# Patient Record
Sex: MALE | Race: BLACK OR AFRICAN AMERICAN
[De-identification: names, ages, dates, MRNs, and addresses within clinical notes are randomized per-mention and may not be internally consistent; named-entity substitution may affect disease eponyms.]

---

## 2020-09-19 ENCOUNTER — HOSPITAL ENCOUNTER (EMERGENCY)
Dept: HOSPITAL 8 - ED | Age: 30
Discharge: HOME | End: 2020-09-19
Payer: COMMERCIAL

## 2020-09-19 VITALS — BODY MASS INDEX: 29.41 KG/M2 | WEIGHT: 187.39 LBS | HEIGHT: 67 IN

## 2020-09-19 VITALS — SYSTOLIC BLOOD PRESSURE: 120 MMHG | DIASTOLIC BLOOD PRESSURE: 66 MMHG

## 2020-09-19 DIAGNOSIS — F17.210: ICD-10-CM

## 2020-09-19 DIAGNOSIS — R10.11: Primary | ICD-10-CM

## 2020-09-19 DIAGNOSIS — R10.13: ICD-10-CM

## 2020-09-19 LAB
ALBUMIN SERPL-MCNC: 3.5 G/DL (ref 3.4–5)
ALP SERPL-CCNC: 64 U/L (ref 45–117)
ALT SERPL-CCNC: 27 U/L (ref 12–78)
ANION GAP SERPL CALC-SCNC: 6 MMOL/L (ref 5–15)
BASOPHILS # BLD AUTO: 0.02 X10^3/UL (ref 0–0.1)
BASOPHILS NFR BLD AUTO: 0 % (ref 0–1)
BILIRUB SERPL-MCNC: 0.2 MG/DL (ref 0.2–1)
CALCIUM SERPL-MCNC: 8.5 MG/DL (ref 8.5–10.1)
CHLORIDE SERPL-SCNC: 109 MMOL/L (ref 98–107)
CREAT SERPL-MCNC: 1.26 MG/DL (ref 0.7–1.3)
EOSINOPHIL # BLD AUTO: 0.11 X10^3/UL (ref 0–0.4)
EOSINOPHIL NFR BLD AUTO: 2 % (ref 1–7)
ERYTHROCYTE [DISTWIDTH] IN BLOOD BY AUTOMATED COUNT: 13.2 % (ref 9.4–14.8)
LYMPHOCYTES # BLD AUTO: 2.31 X10^3/UL (ref 1–3.4)
LYMPHOCYTES NFR BLD AUTO: 36 % (ref 22–44)
MCH RBC QN AUTO: 27.7 PG (ref 27.5–34.5)
MCHC RBC AUTO-ENTMCNC: 31.3 G/DL (ref 33.2–36.2)
MCV RBC AUTO: 88.3 FL (ref 81–97)
MD: NO
MICROSCOPIC: (no result)
MONOCYTES # BLD AUTO: 0.57 X10^3/UL (ref 0.2–0.8)
MONOCYTES NFR BLD AUTO: 9 % (ref 2–9)
NEUTROPHILS # BLD AUTO: 3.37 X10^3/UL (ref 1.8–6.8)
NEUTROPHILS NFR BLD AUTO: 53 % (ref 42–75)
PLATELET # BLD AUTO: 346 X10^3/UL (ref 130–400)
PMV BLD AUTO: 7.6 FL (ref 7.4–10.4)
PROT SERPL-MCNC: 7.3 G/DL (ref 6.4–8.2)
RBC # BLD AUTO: 5.59 X10^6/UL (ref 4.38–5.82)

## 2020-09-19 PROCEDURE — 99406 BEHAV CHNG SMOKING 3-10 MIN: CPT

## 2020-09-19 PROCEDURE — 76700 US EXAM ABDOM COMPLETE: CPT

## 2020-09-19 PROCEDURE — 99284 EMERGENCY DEPT VISIT MOD MDM: CPT

## 2020-09-19 PROCEDURE — 87491 CHLMYD TRACH DNA AMP PROBE: CPT

## 2020-09-19 PROCEDURE — 87086 URINE CULTURE/COLONY COUNT: CPT

## 2020-09-19 PROCEDURE — 80053 COMPREHEN METABOLIC PANEL: CPT

## 2020-09-19 PROCEDURE — 87591 N.GONORRHOEAE DNA AMP PROB: CPT

## 2020-09-19 PROCEDURE — 81001 URINALYSIS AUTO W/SCOPE: CPT

## 2020-09-19 PROCEDURE — 36415 COLL VENOUS BLD VENIPUNCTURE: CPT

## 2020-09-19 PROCEDURE — 85025 COMPLETE CBC W/AUTO DIFF WBC: CPT

## 2020-09-19 PROCEDURE — 83690 ASSAY OF LIPASE: CPT

## 2020-09-19 PROCEDURE — 96372 THER/PROPH/DIAG INJ SC/IM: CPT
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
## 2020-09-19 NOTE — NUR
TASK RN: PT MEDICATED PER MAR, PROVIDED SNACKS. NAD. APPEARS COMFORTABLE, 
DENIES ADDITIONAL NEEDS AT THIS TIME.

## 2020-09-19 NOTE — NUR
D/c discussed with pt. Pt verbalizes understanding r/t meds and f/u care. 
Ambulated out of ED independently, steady gait

## 2020-12-07 ENCOUNTER — OFFICE VISIT (OUTPATIENT)
Dept: URGENT CARE | Facility: CLINIC | Age: 30
End: 2020-12-07
Payer: MEDICAID

## 2020-12-07 ENCOUNTER — HOSPITAL ENCOUNTER (EMERGENCY)
Facility: MEDICAL CENTER | Age: 30
End: 2020-12-08
Attending: EMERGENCY MEDICINE
Payer: MEDICAID

## 2020-12-07 VITALS
SYSTOLIC BLOOD PRESSURE: 110 MMHG | HEART RATE: 106 BPM | DIASTOLIC BLOOD PRESSURE: 74 MMHG | RESPIRATION RATE: 16 BRPM | HEIGHT: 67 IN | WEIGHT: 171 LBS | TEMPERATURE: 97.4 F | OXYGEN SATURATION: 98 % | BODY MASS INDEX: 26.84 KG/M2

## 2020-12-07 DIAGNOSIS — N50.89 TESTICULAR SWELLING, LEFT: ICD-10-CM

## 2020-12-07 DIAGNOSIS — I86.1 LEFT VARICOCELE: ICD-10-CM

## 2020-12-07 DIAGNOSIS — N50.812 LEFT TESTICULAR PAIN: ICD-10-CM

## 2020-12-07 DIAGNOSIS — N50.812 TESTICULAR PAIN, LEFT: ICD-10-CM

## 2020-12-07 PROCEDURE — 99203 OFFICE O/P NEW LOW 30 MIN: CPT | Performed by: NURSE PRACTITIONER

## 2020-12-07 PROCEDURE — 99283 EMERGENCY DEPT VISIT LOW MDM: CPT

## 2020-12-07 PROCEDURE — 96372 THER/PROPH/DIAG INJ SC/IM: CPT

## 2020-12-07 ASSESSMENT — ENCOUNTER SYMPTOMS: FEVER: 0

## 2020-12-08 ENCOUNTER — APPOINTMENT (OUTPATIENT)
Dept: RADIOLOGY | Facility: MEDICAL CENTER | Age: 30
End: 2020-12-08
Attending: EMERGENCY MEDICINE
Payer: MEDICAID

## 2020-12-08 VITALS
SYSTOLIC BLOOD PRESSURE: 128 MMHG | RESPIRATION RATE: 16 BRPM | HEIGHT: 67 IN | TEMPERATURE: 98 F | OXYGEN SATURATION: 98 % | BODY MASS INDEX: 28.44 KG/M2 | HEART RATE: 88 BPM | WEIGHT: 181.22 LBS | DIASTOLIC BLOOD PRESSURE: 88 MMHG

## 2020-12-08 LAB
APPEARANCE UR: ABNORMAL
BACTERIA #/AREA URNS HPF: NEGATIVE /HPF
BILIRUB UR QL STRIP.AUTO: NEGATIVE
COLOR UR: YELLOW
EPI CELLS #/AREA URNS HPF: NEGATIVE /HPF
GLUCOSE UR STRIP.AUTO-MCNC: NEGATIVE MG/DL
HYALINE CASTS #/AREA URNS LPF: ABNORMAL /LPF
KETONES UR STRIP.AUTO-MCNC: NEGATIVE MG/DL
LEUKOCYTE ESTERASE UR QL STRIP.AUTO: NEGATIVE
MICRO URNS: ABNORMAL
NITRITE UR QL STRIP.AUTO: NEGATIVE
PH UR STRIP.AUTO: 6.5 [PH] (ref 5–8)
PROT UR QL STRIP: NEGATIVE MG/DL
RBC # URNS HPF: ABNORMAL /HPF
RBC UR QL AUTO: NEGATIVE
SP GR UR STRIP.AUTO: 1.02
UROBILINOGEN UR STRIP.AUTO-MCNC: 1 MG/DL
WBC #/AREA URNS HPF: ABNORMAL /HPF

## 2020-12-08 PROCEDURE — 81001 URINALYSIS AUTO W/SCOPE: CPT

## 2020-12-08 PROCEDURE — 700111 HCHG RX REV CODE 636 W/ 250 OVERRIDE (IP): Performed by: EMERGENCY MEDICINE

## 2020-12-08 PROCEDURE — 76870 US EXAM SCROTUM: CPT

## 2020-12-08 PROCEDURE — 96372 THER/PROPH/DIAG INJ SC/IM: CPT

## 2020-12-08 RX ORDER — HYDROCODONE BITARTRATE AND ACETAMINOPHEN 5; 325 MG/1; MG/1
1 TABLET ORAL EVERY 6 HOURS PRN
Qty: 10 TAB | Refills: 0 | Status: SHIPPED | OUTPATIENT
Start: 2020-12-08 | End: 2020-12-11

## 2020-12-08 RX ORDER — KETOROLAC TROMETHAMINE 30 MG/ML
60 INJECTION, SOLUTION INTRAMUSCULAR; INTRAVENOUS ONCE
Status: COMPLETED | OUTPATIENT
Start: 2020-12-08 | End: 2020-12-08

## 2020-12-08 RX ADMIN — KETOROLAC TROMETHAMINE 60 MG: 30 INJECTION, SOLUTION INTRAMUSCULAR; INTRAVENOUS at 00:49

## 2020-12-08 NOTE — ED PROVIDER NOTES
ED Provider Note    Briefly met with patient to expedite work-up since I am going off shift. He is here with left testicular pain for 1 day.  He was sent here from urgent care to have a scrotal ultrasound.  I have ordered this study as well as a UA.  Will be seen by next ERP.    Yifan Dunne II, M.D. 12/8/2020 12:00 AM

## 2020-12-08 NOTE — ED PROVIDER NOTES
ED Provider Note    Scribed for Aparna Jason D.O. by Tam Sung. 12/8/2020  12:09 AM    Primary care provider: Pcp Pt States None  Means of arrival: Walk-in  History obtained from: Patient  History limited by: None    CHIEF COMPLAINT  Chief Complaint   Patient presents with   • Testicle Pain     sudden onsent x 1 day PTA. L testicle swelling. Sent over by urgent care for ultrasound.       HPI  Timothy Cook is a 30 y.o. male who presents the Emergency Department for evaluation of acute left-sided testicular pain onset in the afternoon of 12/6/2020. The patient reports that on 12/6/2020, he began experiencing significant left testicular pain with no known precipitating injuries and was referred to the ED by urgent care for a testicular ultrasound. He endorses associated left testicular edema, but he denies any associated hematuria, burning with urination, or fevers. He reports that his left testicular pain is exacerbated with bodily movements, but no alleviating factors were identified for the patient's left testicular pain. The patient is sexually active with one partner and reports using condoms, and he denies any concerns for sexually transmitted illnesses. The patient denies any prior long-term medical conditions including ulcers, cancer, or diabetes. He also denies taking any regular medications.    PPE Note: I personally donned PPE for all patient encounters during this visit, including wearing an N95 respirator mask and eye protection. Scribe remained outside the patient's room and did not have any contact with the patient for the duration of patient encounter.      REVIEW OF SYSTEMS  Pertinent positives include: Left testicular pain, left testicular swelling. Pertinent negatives include no: fever, hematuria, dysuria. See HPI for further details.    PAST MEDICAL HISTORY  History reviewed. No pertinent past medical history.    FAMILY HISTORY  History reviewed. No pertinent family  "history.    SOCIAL HISTORY  Social History     Socioeconomic History   • Marital status: Single                                                                       Tobacco Use   • Smoking status: Current Every Day Smoker   • Smokeless tobacco: Never Used   Substance and Sexual Activity   • Alcohol use: Yes     SURGICAL HISTORY  Past Surgical History:   Procedure Laterality Date   • APPENDECTOMY         CURRENT MEDICATIONS  Home Medications    **Home medications have not yet been reviewed for this encounter**         ALLERGIES  Allergies   Allergen Reactions   • Ibuprofen      Can not take it d/t hx of bleeding ulcers       PHYSICAL EXAM  VITAL SIGNS: /97   Pulse (!) 109   Temp 37.7 °C (99.9 °F) (Temporal)   Resp 16   Ht 1.702 m (5' 7\")   Wt 82.2 kg (181 lb 3.5 oz)   SpO2 99%   BMI 28.38 kg/m²     Constitutional: Patient is well developed, well nourished. In moderate distress.  Cardiovascular: Normal heart rate and rhythm. No murmur  Thorax & Lungs: Clear and equal breath sounds with good excursion. No respiratory distress  Abdomen: Mild left groin tenderness with no palpable masses. No inguinal hernias felt upon examination. Bowel sounds normal in all four quadrants.    Genitalia: Left testicle is markedly larger than right with extreme tenderness upon minimal palpation. No scrotal erythema or lesions. Penis is circumcised without drainage.    Extremities: Peripheral pulses 4/4,  No tenderness  Musculoskeletal: Normal range of motion in all major joints.  Neurologic: Alert & oriented x 3, Normal motor function, Normal sensory function, DTR's 4/4 bilaterally.  Psychiatric: Affect normal, Judgment normal, Mood normal.     LABS  Labs Reviewed   URINALYSIS,CULTURE IF INDICATED - Abnormal; Notable for the following components:       Result Value    Character Cloudy (*)     All other components within normal limits    Narrative:     Indication for culture:->Patient WITHOUT an indwelling Arana  catheter in " place with new onset of Dysuria, Frequency,  Urgency, and/or Suprapubic pain   URINE MICROSCOPIC (W/UA) - Abnormal; Notable for the following components:    WBC 0-2 (*)     RBC 0-2 (*)     All other components within normal limits    Narrative:     Indication for culture:->Patient WITHOUT an indwelling Arana  catheter in place with new onset of Dysuria, Frequency,  Urgency, and/or Suprapubic pain      All labs reviewed by me.       RADIOLOGY/PROCEDURES  [id number]   Final Result      1.  No evidence of testicular mass or torsion.      2.  Left-sided varicocele.        The radiologist's interpretation of all radiological studies have been reviewed by me.    COURSE & MEDICAL DECISION MAKING  Pertinent Labs & Imaging studies reviewed. (See chart for details)      12:09 AM - Patient seen and examined at bedside. Patient will be treated with Toradol 60 mg for his testicular pain. Ordered [id number] and UA Culture to evaluate his symptoms. The differential diagnoses include but are not limited to: Testicular torsion, epididymitis, orchitis, inguinal hernia    1:07 AM - Patient was reevaluated at bedside. Discussed the results of the patient's testicular ultrasound with the patient and informed them that there is no evidence of testicular mass or torsion as outlined above. However, there is a left-sided varicocele which I discussed with the patient. I have recommended that he use an athletic supporter and follow up with urology for further evaluation. The patient will also be given a prescription for Norco 5-325 mg to be used as needed for pain management. He understands to return to the ED for any new or worsening symptoms including fevers. At this time, I am comfortable safely discharging the patient home, and he is agreeable and understanding to the plan for discharge.     I reviewed prescription monitoring program for patient's narcotic use before prescribing a scheduled drug.The patient will not  drink alcohol nor drive with prescribed medications. The patient will return for new or worsening symptoms and is stable at the time of discharge.    The patient is referred to a primary physician for blood pressure management, diabetic screening, and for all other preventative health concerns.    In prescribing controlled substances to this patient, I certify that I have obtained and reviewed the medical history of Timothy Cook. I have also made a good lyndsey effort to obtain applicable records from other providers who have treated the patient and there was nothing to indicate an abuse potential.    I have conducted a physical exam and documented it. I have reviewed Mr. Cook’s prescription history as maintained by the [place] Prescription Monitoring Program.     I have assessed the patient’s risk for abuse, dependency, and addiction using the validated Opioid Risk Tool available at https://www.mdcalc.com/myhuth-mdaq-wogl-ort-narcotic-abuse.     Given the above, I believe the benefits of controlled substance therapy outweigh the risks. The reasons for prescribing controlled substances include patient does not respond to anti-inflammatories in the emergency department and will require something stronger.. Accordingly, I have discussed the risk and benefits, treatment plan, and alternative therapies with the patient.       DISPOSITION:  Patient will be discharged home in stable condition.    FOLLOW UP:  Tushar Corrigan M.D.  [address] [facility]  [place] [address]  [phone]    Schedule an appointment as soon as possible for a visit today      OUTPATIENT MEDICATIONS:  Discharge Medication List as of 12/8/2020  1:42 AM      START taking these medications    Details   HYDROcodone-acetaminophen (NORCO) 5-325 MG Tab per tablet Take 1 Tab by mouth every 6 hours as needed for up to 3 days.Take with foodDisp-10 Tab, R-0, Print Rx Paper             FINAL IMPRESSION  1. Left testicular pain    2. Testicular swelling, left    3.  Left varicocele         I, Tam Sung (Scribe), am scribing for, and in the presence of, Aparna Jason D.O..    Electronically signed by: Tam Sung (Scribe), 12/8/2020    IAparna D.O. personally performed the services described in this documentation, as scribed by Tam Sung in my presence, and it is both accurate and complete.    E    The note accurately reflects work and decisions made by me.  Aparna Jason D.O.  12/8/2020  9:22 AM

## 2020-12-08 NOTE — ED TRIAGE NOTES
"Chief Complaint   Patient presents with   • Testicle Pain     sudden onsent x 1 day PTA. L testicle swelling. Sent over by urgent care for ultrasound.     Pt had sudden onset L testicle pain begin yesterday. Pt was at rest when pain began. L testicle swelling associated w/ pain. Sent from Urgent care for ultrasound.     /97   Pulse (!) 109   Temp 37.7 °C (99.9 °F) (Temporal)   Resp 16   Ht 1.702 m (5' 7\")   Wt 82.2 kg (181 lb 3.5 oz)   SpO2 99%   BMI 28.38 kg/m²   "

## 2020-12-08 NOTE — PROGRESS NOTES
"  Subjective:     Timothy Cook is a 30 y.o. male who presents for Groin Pain (no bump or injury, pain all the time worse when standing or moving, pain radiates to L testicle x1 day)      Swelling in left groin and testicle. Pain 20/10. Hurts to walk and sit. No redness. No dysuria. No hematuria. No fever. No mass. Thought the pain could be from a \"hair bump\" at first. Much worse today. Hurts to laugh and cough. Denies trauma.     Groin Pain  This is a new problem. The current episode started yesterday. The problem occurs constantly. The problem has been rapidly worsening. Pertinent negatives include no abdominal pain, change in bowel habit, fever, rash, urinary symptoms or vomiting. The symptoms are aggravated by standing and walking (Touch). He has tried nothing for the symptoms.       History reviewed. No pertinent past medical history.    Past Surgical History:   Procedure Laterality Date   • APPENDECTOMY         Social History     Socioeconomic History   • Marital status: Single     Spouse name: Not on file   • Number of children: Not on file   • Years of education: Not on file   • Highest education level: Not on file   Occupational History   • Not on file   Social Needs   • Financial resource strain: Not on file   • Food insecurity     Worry: Not on file     Inability: Not on file   • Transportation needs     Medical: Not on file     Non-medical: Not on file   Tobacco Use   • Smoking status: Current Every Day Smoker   • Smokeless tobacco: Never Used   Substance and Sexual Activity   • Alcohol use: Yes   • Drug use: Not on file   • Sexual activity: Not on file   Lifestyle   • Physical activity     Days per week: Not on file     Minutes per session: Not on file   • Stress: Not on file   Relationships   • Social connections     Talks on phone: Not on file     Gets together: Not on file     Attends Cheondo[employer] service: Not on file     Active member of club or organization: Not on file     Attends meetings of " "clubs or organizations: Not on file     Relationship status: Not on file   • Intimate partner violence     Fear of current or ex partner: Not on file     Emotionally abused: Not on file     Physically abused: Not on file     Forced sexual activity: Not on file   Other Topics Concern   • Not on file   Social History Narrative   • Not on file        History reviewed. No pertinent family history.     Allergies   Allergen Reactions   • Ibuprofen      Can not take it d/t hx of bleeding ulcers       Review of Systems   Constitutional: Negative for fever.   Gastrointestinal: Negative for abdominal pain, change in bowel habit and vomiting.   Genitourinary: Negative for dysuria, flank pain and hematuria.        No hx of similar symptoms.    Skin: Negative for itching and rash.   All other systems reviewed and are negative.       Objective:   /74 (BP Location: Left arm, Patient Position: Sitting, BP Cuff Size: Adult)   Pulse (!) 106   Temp 36.3 °C (97.4 °F) (Temporal)   Resp 16   Ht 1.702 m (5' 7\")   Wt 77.6 kg (171 lb)   SpO2 98%   BMI 26.78 kg/m²     Physical Exam  Vitals signs reviewed.   Constitutional:       General: He is not in acute distress.     Appearance: He is well-developed.      Comments: Patient sitting in wheelchair for comfort, stating it hurts too much to walk.    HENT:      Head: Normocephalic and atraumatic.      Right Ear: External ear normal.      Left Ear: External ear normal.      Nose: Nose normal.   Eyes:      Conjunctiva/sclera: Conjunctivae normal.   Neck:      Musculoskeletal: Normal range of motion.   Cardiovascular:      Rate and Rhythm: Normal rate.   Pulmonary:      Effort: Pulmonary effort is normal.   Abdominal:      General: There is no distension.      Palpations: Abdomen is soft.      Hernia: There is no hernia in the left inguinal area.   Genitourinary:     Penis: Normal.       Testes:         Right: Right testis is descended.         Left: Tenderness, swelling and " varicocele present. Left testis is descended. Cremasteric reflex is present.       Epididymis:      Left: Tenderness present.      Comments: Negative Phren's sign. Diminished left cremasteric reflex. Generalized tenderness to palpation of left testes, greatest in area of epididymis and spermatic cord. No abscess, lesions, or discoloration of scrotum.  Musculoskeletal: Normal range of motion.      Comments: Antalgic gait.   Skin:     General: Skin is warm and dry.      Findings: No bruising, erythema or rash.   Neurological:      General: No focal deficit present.      Mental Status: He is alert and oriented to person, place, and time.      GCS: GCS eye subscore is 4. GCS verbal subscore is 5. GCS motor subscore is 6.   Psychiatric:         Mood and Affect: Mood normal.         Speech: Speech normal.         Behavior: Behavior normal.         Thought Content: Thought content normal.         Judgment: Judgment normal.         Assessment/Plan:   1. Testicular pain, left    Referred to the ER for emergent evaluation of testicular pain and swelling. Discussed need for stat ultrasound. Concerns for testicular torsion based on level of pain. Differential to include epididymitis or varicocele.     Differential diagnosis, natural history, supportive care, and indications for immediate follow-up discussed.

## 2020-12-08 NOTE — DISCHARGE INSTRUCTIONS
Get an athletic supporter/jockstrap and wear it for support of your scrotum.  When you are lying down elevate your scrotum on towel roll  Take pain medication as directed  Alternate between ice and moist heat  Call urology [place] today to schedule an appointment for further evaluation and treatment for your varicocele and swelling.  Return if fever or worsening.

## 2020-12-09 RX ORDER — FAMOTIDINE 20 MG/1
TABLET, FILM COATED ORAL
COMMUNITY
Start: 2020-09-19 | End: 2021-07-04

## 2020-12-09 RX ORDER — SUCRALFATE 1 G/1
TABLET ORAL
COMMUNITY
Start: 2020-09-19 | End: 2021-07-04

## 2020-12-09 ASSESSMENT — ENCOUNTER SYMPTOMS
VOMITING: 0
CHANGE IN BOWEL HABIT: 0
FLANK PAIN: 0
ABDOMINAL PAIN: 0

## 2020-12-10 NOTE — PATIENT INSTRUCTIONS
Scrotal Swelling  Scrotal swelling refers to a condition in which the sac of skin that contains the testes (scrotum) is enlarged or swollen. Many things can cause the scrotum to enlarge or swell, including:  · Fluid around the testicle (hydrocele).  · A weakened area in the muscles around the groin (hernia).  · An enlarged vein around the testicle (varicocele).  · An injury.  · An infection.  · Certain medical treatments.  · Certain medical conditions, such as congestive heart failure.  · A recent genital surgery or procedure.  · A twisting of the spermatic cord that cuts off blood supply (testicular torsion).  · Testicular cancer.  Scrotal swelling can happen along with scrotal pain.  Follow these instructions at home:  · Until the swelling goes away:  ? Rest. The best position to rest in is to lie down.  ? Limit activity.  · Put ice on the scrotum:  ? Put ice in a plastic bag.  ? Place a towel between your skin and the bag.  ? Leave the ice on for 20 minutes, 2-3 times a day for 1-2 days.  · Place a rolled towel under your testicles for support.  · Wear loose-fitting clothing or an athletic support cup for comfort.  · Take over-the-counter and prescription medicines only as told by your health care provider.  · Perform a monthly self-exam of the scrotum and penis. Feel for changes. Ask your health care provider how to perform a monthly self-exam if you are unsure.  Contact a health care provider if:  · You have a sudden pain that is persistent and does not improve.  · You have a heavy feeling or notice fluid in the scrotum.  · You have pain or burning while urinating.  · You have blood in your urine or semen.  · You feel a lump around the testicle.  · You notice that one testicle is larger than the other. Keep in mind that a small difference in size is normal.  · You have a persistent dull ache or pain in your groin or scrotum.  Get help right away if:  · The pain does not go away.  · The pain becomes  severe.  · You have a fever or chills.  · You have pain or vomiting that cannot be controlled.  · One or both sides of the scrotum are very red and swollen.  · There is redness spreading upward from your scrotum to your abdomen or downward from your scrotum to your thighs.  Summary  · Scrotal swelling refers to a condition in which the sac of skin that contains the testes (scrotum) is enlarged.  · Many things can cause the scrotum to swell, including hydrocele, a hernia, and a varicocele.  · Limiting activity and icing the scrotum may help reduce swelling and pain.  · Contact your health care provider if you develop scrotal pain that is sudden and persistent, or if you have pain while urinating. Do this also if you feel a lump around the testicle or notice blood in your urine or semen.  · Get help right away for uncontrolled pain or vomiting, for very red and swollen scrotum, or for fever or chills.  This information is not intended to replace advice given to you by your health care provider. Make sure you discuss any questions you have with your health care provider.  Document Released: 01/20/2012 Document Revised: 11/30/2018 Document Reviewed: 03/05/2018  Elsevier Patient Education © 2020 [employer]

## 2021-01-20 ENCOUNTER — HOSPITAL ENCOUNTER (EMERGENCY)
Facility: MEDICAL CENTER | Age: 31
End: 2021-01-20
Attending: EMERGENCY MEDICINE
Payer: MEDICAID

## 2021-01-20 VITALS
RESPIRATION RATE: 16 BRPM | BODY MASS INDEX: 27.82 KG/M2 | HEIGHT: 67 IN | OXYGEN SATURATION: 98 % | TEMPERATURE: 98.2 F | WEIGHT: 177.25 LBS | SYSTOLIC BLOOD PRESSURE: 150 MMHG | HEART RATE: 89 BPM | DIASTOLIC BLOOD PRESSURE: 86 MMHG

## 2021-01-20 DIAGNOSIS — H10.31 ACUTE BACTERIAL CONJUNCTIVITIS OF RIGHT EYE: ICD-10-CM

## 2021-01-20 PROCEDURE — 99283 EMERGENCY DEPT VISIT LOW MDM: CPT

## 2021-01-20 PROCEDURE — 96372 THER/PROPH/DIAG INJ SC/IM: CPT

## 2021-01-20 PROCEDURE — 700111 HCHG RX REV CODE 636 W/ 250 OVERRIDE (IP): Performed by: EMERGENCY MEDICINE

## 2021-01-20 PROCEDURE — A9270 NON-COVERED ITEM OR SERVICE: HCPCS | Performed by: EMERGENCY MEDICINE

## 2021-01-20 PROCEDURE — 700101 HCHG RX REV CODE 250: Performed by: EMERGENCY MEDICINE

## 2021-01-20 PROCEDURE — 700102 HCHG RX REV CODE 250 W/ 637 OVERRIDE(OP): Performed by: EMERGENCY MEDICINE

## 2021-01-20 RX ORDER — ACETAMINOPHEN 500 MG
1000 TABLET ORAL ONCE
Status: COMPLETED | OUTPATIENT
Start: 2021-01-20 | End: 2021-01-20

## 2021-01-20 RX ORDER — GENTAMICIN SULFATE 3 MG/ML
1 SOLUTION/ DROPS OPHTHALMIC 4 TIMES DAILY
Qty: 5 ML | Refills: 0 | Status: SHIPPED | OUTPATIENT
Start: 2021-01-20 | End: 2021-01-25

## 2021-01-20 RX ORDER — KETOROLAC TROMETHAMINE 30 MG/ML
15 INJECTION, SOLUTION INTRAMUSCULAR; INTRAVENOUS ONCE
Status: COMPLETED | OUTPATIENT
Start: 2021-01-20 | End: 2021-01-20

## 2021-01-20 RX ORDER — HYDROCODONE BITARTRATE AND ACETAMINOPHEN 5; 325 MG/1; MG/1
1 TABLET ORAL EVERY 6 HOURS PRN
Qty: 18 TAB | Refills: 0 | Status: SHIPPED | OUTPATIENT
Start: 2021-01-20 | End: 2021-01-25

## 2021-01-20 RX ADMIN — ACETAMINOPHEN 1000 MG: 500 TABLET ORAL at 22:46

## 2021-01-20 RX ADMIN — KETOROLAC TROMETHAMINE 15 MG: 30 INJECTION, SOLUTION INTRAMUSCULAR at 22:46

## 2021-01-20 RX ADMIN — FLUORESCEIN SODIUM 1 MG: 1 STRIP OPHTHALMIC at 22:15
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
## 2021-07-04 ENCOUNTER — HOSPITAL ENCOUNTER (EMERGENCY)
Facility: MEDICAL CENTER | Age: 31
End: 2021-07-04
Attending: EMERGENCY MEDICINE
Payer: MEDICAID

## 2021-07-04 VITALS
OXYGEN SATURATION: 98 % | HEART RATE: 97 BPM | DIASTOLIC BLOOD PRESSURE: 88 MMHG | BODY MASS INDEX: 27.61 KG/M2 | SYSTOLIC BLOOD PRESSURE: 131 MMHG | HEIGHT: 67 IN | RESPIRATION RATE: 14 BRPM | TEMPERATURE: 97.8 F | WEIGHT: 175.93 LBS

## 2021-07-04 DIAGNOSIS — S31.21XA PENILE LACERATION, INITIAL ENCOUNTER: ICD-10-CM

## 2021-07-04 PROCEDURE — 303747 HCHG EXTRA SUTURE

## 2021-07-04 PROCEDURE — 304999 HCHG REPAIR-SIMPLE/INTERMED LEVEL 1

## 2021-07-04 PROCEDURE — 99283 EMERGENCY DEPT VISIT LOW MDM: CPT

## 2021-07-04 PROCEDURE — A9270 NON-COVERED ITEM OR SERVICE: HCPCS | Performed by: EMERGENCY MEDICINE

## 2021-07-04 PROCEDURE — 700102 HCHG RX REV CODE 250 W/ 637 OVERRIDE(OP): Performed by: EMERGENCY MEDICINE

## 2021-07-04 PROCEDURE — 700101 HCHG RX REV CODE 250: Performed by: EMERGENCY MEDICINE

## 2021-07-04 RX ORDER — DIAZEPAM 5 MG/1
5 TABLET ORAL
Qty: 7 TABLET | Refills: 0 | Status: SHIPPED | OUTPATIENT
Start: 2021-07-04 | End: 2021-07-04 | Stop reason: SDUPTHER

## 2021-07-04 RX ORDER — LIDOCAINE HYDROCHLORIDE 10 MG/ML
2 INJECTION, SOLUTION INFILTRATION; PERINEURAL ONCE
Status: COMPLETED | OUTPATIENT
Start: 2021-07-04 | End: 2021-07-04

## 2021-07-04 RX ORDER — HYDROCODONE BITARTRATE AND ACETAMINOPHEN 5; 325 MG/1; MG/1
1 TABLET ORAL ONCE
Status: COMPLETED | OUTPATIENT
Start: 2021-07-04 | End: 2021-07-04

## 2021-07-04 RX ORDER — CEPHALEXIN 500 MG/1
500 CAPSULE ORAL 4 TIMES DAILY
Qty: 20 CAPSULE | Refills: 0 | Status: SHIPPED | OUTPATIENT
Start: 2021-07-04 | End: 2021-07-09

## 2021-07-04 RX ORDER — DIAZEPAM 5 MG/1
5 TABLET ORAL
Qty: 5 TABLET | Refills: 0 | Status: SHIPPED | OUTPATIENT
Start: 2021-07-04 | End: 2021-07-09

## 2021-07-04 RX ADMIN — LIDOCAINE HYDROCHLORIDE 2 ML: 10 INJECTION, SOLUTION INFILTRATION; PERINEURAL at 18:45

## 2021-07-04 RX ADMIN — HYDROCODONE BITARTRATE AND ACETAMINOPHEN 1 TABLET: 5; 325 TABLET ORAL at 18:49

## 2021-07-05 NOTE — ED TRIAGE NOTES
Pt to ED with complaints of a laceration to underside of penis. Happened during intercourse at approx 1630 today. Unsure what happened, but thinks it might be related to partners IUD. Reports oozing blood.    Mask in place. No respiratory complaint.     Pt educated on ED process and asked to wait in lobby. Patient educated on importance of alerting staff to new or worsening symptoms or concerns.

## 2021-07-05 NOTE — ED PROVIDER NOTES
ED Provider Note    Scribed for Kj Mercer M.D. by Rochelle Kenney. 7/4/2021  6:09 PM    Primary care provider: Pcp Pt States None  Means of arrival: Walk in  History obtained from: Patient  History limited by: None    CHIEF COMPLAINT  Chief Complaint   Patient presents with   • Penis Pain   • Laceration       HPI  Timothy Cook is a 30 y.o. male who presents to the Emergency Department for evaluation of genital laceration onset prior to arrival. Patient states he was having intercourse when he suddenly felt pain to the tip of his penis and noticed he had a cut there. He believes his partner has an IUD. He admits to associated symptoms of pain in his penis.  Denies fever. No alleviating factors were reported. Patient does not have any other medical problems.      REVIEW OF SYSTEMS  Pertinent positives include penis pain. Pertinent negatives include no fever.      PAST MEDICAL HISTORY   has a past medical history of History of bleeding ulcers.    SURGICAL HISTORY   has a past surgical history that includes appendectomy.    SOCIAL HISTORY  Social History     Tobacco Use   • Smoking status: Current Every Day Smoker     Packs/day: 1.00     Years: 15.00     Pack years: 15.00     Types: Cigarettes   • Smokeless tobacco: Never Used   Vaping Use   • Vaping Use: Never used   Substance Use Topics   • Alcohol use: Not Currently   • Drug use: Not Currently      Social History     Substance and Sexual Activity   Drug Use Not Currently       FAMILY HISTORY  History reviewed. No pertinent family history.    CURRENT MEDICATIONS  Home Medications     Reviewed by Sabi Paul R.N. (Registered Nurse) on 07/04/21 at 1722  Med List Status: Complete   Medication Last Dose Status        Patient Andrew Taking any Medications                       ALLERGIES  Allergies   Allergen Reactions   • Ibuprofen      Can not take it d/t hx of bleeding ulcers       PHYSICAL EXAM  VITAL SIGNS: /83   Pulse (!) 120   Temp 36.7 °C  "(98 °F) (Temporal)   Resp 18   Ht 1.702 m (5' 7\")   Wt 79.8 kg (175 lb 14.8 oz)   SpO2 99%   BMI 27.55 kg/m²   Pulse ox interpretation: I interpret this pulse ox as normal.  Constitutional: Alert in no apparent distress.  HENT: Normocephalic, Atraumatic, Bilateral external ears normal. Nose normal.   Heart: Regular rate and rythm   Lungs: no respiratory distress  Skin: Warm, Dry, No erythema, No rash.  : Tear just proximal to the frenulum of the penis.  Wound is joseline shaped approximately 1 cm in length in a transverse lie.  Scant bleeding from the wound site.  Neurologic: Alert, Grossly non-focal.     PROCEDURES  Laceration Repair Procedure Note    Indication: Laceration    Procedure: The patient was placed in the appropriate position and anesthesia around the laceration was obtained by infiltration using 1% Lidocaine without epinephrine. The area was then cleansed using saline. The laceration was closed with 5-0 Chromic using interrupted sutures. There were no additional lacerations requiring repair. The wound area was then dressed with bacitracin.      Total repaired wound length: 1 cm.     Other Items: Suture count: 3    The patient tolerated the procedure well.    Complications: None      COURSE & MEDICAL DECISION MAKING  Pertinent Labs & Imaging studies reviewed. (See chart for details)    6:09 PM - Patient seen and examined at bedside.     6:18 PM Paged Urology.    6:29 PM I discussed the patient's case and the above findings with Dr. Garcia (Urologist) who recommends Valium at night to help avoid erections at night.  Recommending oral antibiotics and wound repair with chromic sutures and loose closure    6:41 PM Ordered Norco 5-325 mg PO to treat patient.    6:50 PM I performed a laceration repair procedure as noted above. I discussed plan for discharge and follow up as outlined below. The patient verbalizes they feel comfortable going home. The patient is stable for discharge at this time and will " return for any new or worsening symptoms. Patient verbalizes understanding and support with my plan for discharge.       Decision Making:  This is a 30 y.o. year old male who presents with tear to the tip of the penis just proximal to the frenulum.  Has a large wound there.  I did speak with urology, Dr. Garcia.  Recommending primary closure with chromic gut sutures.  He states that there is a risk that the sutures will tear the patient has an erection.  Recommending Valium at night to help avoid nocturnal erections.  Recommending antibiotics as well.    Wound was closed without any complications using local analgesia.  Treated with bacitracin and recommending reapplying bacitracin about 3 times daily.  He was discharged home with prophylactic antibiotics and a prescription for Valium.  He should follow-up with a urologist should he have any complications.  Should he have any acute worsening of his pain or signs of infection he should return to the emergency department.     The patient will return for new or worsening symptoms and is stable at the time of discharge. Patient was given return precautions. Patient and/or family member verbalizes understanding and will comply.    DISPOSITION:  Patient will be discharged home in stable condition.    FOLLOW UP:  [facility], Emergency Dept  [address] [facility] [id number] [phone]    As needed, If symptoms worsen    Primary care doctor    Schedule an appointment as soon as possible for a visit       Sheldon Garcia M.D.  [address] [facility] [address]  [phone]    Schedule an appointment as soon as possible for a visit         OUTPATIENT MEDICATIONS:  New Prescriptions    CEPHALEXIN (KEFLEX) 500 MG CAP    Take 1 capsule by mouth 4 times a day for 5 days.    DIAZEPAM (VALIUM) 5 MG TAB    Take 1 tablet by mouth at bedtime as needed for Sleep for up to 5 days.       FINAL IMPRESSION  1. Penile laceration, initial encounter        Laceration repair procedure performed by ERP    This dictation has been created using voice recognition software and/or scribes. The accuracy of the dictation is limited by the abilities of the software and the expertise of the scribes. I expect there may be some errors of grammar and possibly content. I made every attempt to manually correct the errors within my dictation. However, errors related to voice recognition software and/or scribes may still exist and should be interpreted within the appropriate context.    The note accurately reflects work and decisions made by me.  Kj Mercer M.D.  7/4/2021  8:06 PM    Rochelle DANEILS (Scribe), am scribing for, and in the presence of, Kj Mercer M.D..    Electronically signed by: Rochelle Kenney (Kyung), 7/4/2021    Kj DANIELS M.D. personally performed the services described in this documentation, as scribed by Rochelle Kenney in my presence, and it is both accurate and complete. E